# Patient Record
Sex: FEMALE | Race: WHITE | NOT HISPANIC OR LATINO | Employment: OTHER | URBAN - METROPOLITAN AREA
[De-identification: names, ages, dates, MRNs, and addresses within clinical notes are randomized per-mention and may not be internally consistent; named-entity substitution may affect disease eponyms.]

---

## 2023-05-10 PROBLEM — R55 SYNCOPE: Status: ACTIVE | Noted: 2023-05-10

## 2023-11-12 ENCOUNTER — ON-DEMAND VIRTUAL (OUTPATIENT)
Dept: URGENT CARE | Facility: CLINIC | Age: 63
End: 2023-11-12
Payer: MEDICARE

## 2023-11-12 DIAGNOSIS — H57.10 PAIN IN EYE, UNSPECIFIED LATERALITY: Primary | ICD-10-CM

## 2023-11-12 PROCEDURE — 99203 OFFICE O/P NEW LOW 30 MIN: CPT | Mod: 95,,, | Performed by: NURSE PRACTITIONER

## 2023-11-12 PROCEDURE — 99203 PR OFFICE/OUTPT VISIT, NEW, LEVL III, 30-44 MIN: ICD-10-PCS | Mod: 95,,, | Performed by: NURSE PRACTITIONER

## 2023-11-12 NOTE — PROGRESS NOTES
Subjective:      Patient ID: Francesca Arrington is a 63 y.o. female.    Vitals:  vitals were not taken for this visit.     Chief Complaint: No chief complaint on file.      Visit Type: TELE AUDIOVISUAL    Present with the patient at the time of consultation: TELEMED PRESENT WITH PATIENT: None    Past Medical History:   Diagnosis Date    Anxiety     Breast cancer     COPD (chronic obstructive pulmonary disease)     Fibromyalgia     Heart murmur     Hyperlipidemia     Hypoglycemia     IBS (irritable bowel syndrome)     Insomnia     Lymphedema     Migraine     Seizure disorder     this is syncope not epilepsy    Syncope 06/2022     Past Surgical History:   Procedure Laterality Date    BILATERAL MASTECTOMY Bilateral 2019    HYSTERECTOMY      IMPLANTATION OF PERMANENT SACRAL NERVE STIMULATOR      SINUS SURGERY      TONSILLECTOMY, ADENOIDECTOMY       Review of patient's allergies indicates:   Allergen Reactions    Iodine Anaphylaxis    Sulfa (sulfonamide antibiotics) Swelling    Codeine     Hydrocodone-acetaminophen      Current Outpatient Medications on File Prior to Visit   Medication Sig Dispense Refill    acarbose (PRECOSE) 25 MG Tab Take by mouth.      ADVAIR -21 mcg/actuation HFAA inhaler Inhale 2 puffs into the lungs 2 (two) times daily.      atorvastatin (LIPITOR) 10 MG tablet       COMBIVENT RESPIMAT  mcg/actuation inhaler       dexAMETHasone (DECADRON) 4 MG Tab Take 2.5 tabs daily for 5 days 13 tablet 0    DULoxetine (CYMBALTA) 30 MG capsule       eszopiclone (LUNESTA) 2 MG Tab       gabapentin (NEURONTIN) 400 MG capsule       galcanezumab-gnlm (EMGALITY PEN) 120 mg/mL PnIj Inject 120 mg into the skin every 28 days. 1 mL 11    HYDROcodone-acetaminophen (NORCO) 7.5-325 mg per tablet       LORazepam (ATIVAN) 0.5 MG tablet Take 0.5 mg by mouth daily as needed.      protriptyline (VIVACTIL) 10 MG Tab Take 1 tablet (10 mg total) by mouth 2 (two) times daily. 180 tablet 3    roflumilast (DALIRESP) 500 mcg Tab   "     tiotropium bromide (SPIRIVA RESPIMAT) 2.5 mcg/actuation inhaler       traZODone (DESYREL) 100 MG tablet       UBRELVY 100 mg tablet TAKE 1 TABLET BY MOUTH AS NEEDED FOR MIGRAINE 10 tablet 3     No current facility-administered medications on file prior to visit.     Family History   Problem Relation Age of Onset    Heart disease Mother     Stroke Mother     Asbestos Father     Migraines Sister     Suicide Sister     Schizophrenia Sister     Suicide Brother         1/2023    Migraines Brother     Migraines Other     Migraines Other     Migraines Son     Migraines Daughter            [facility] Odvv Intake    11/12/2023  5:48 PM CST - Filed by Patient   Describe your reason for todays visit I have been having a thunderclap in my eye and right temple since yesterday that lasts a few seconds but comes every 5 minutes or less. I hurts so bad   What is your current physical address in the event of a medical emergency? [address] [place] [address]   Are you able to take your vital signs? Yes   Systolic Blood Pressure: 128   Diastolic Blood Pressure: 75   Weight: 140   Height: 5   Pulse: 97   Temperature: 96.8   Respiration rate:    Pulse Oxygen: 95   Please attach any relevant images or files          62 yo female with c/o searing pain to right eye. She states she has hx of migraines and it is not a migraine. She states "worst pain of entire life".  She denies visual changes. Denies nausea , vomiting. Denies weakness, denies facial numbness.         Constitution: Negative.   HENT: Negative.     Cardiovascular: Negative.    Eyes:  Positive for eye pain. Negative for eye trauma, foreign body in eye, eye discharge, eye redness, photophobia, vision loss, double vision, blurred vision and eyelid swelling.   Respiratory: Negative.     Gastrointestinal: Negative.    Endocrine: negative.   Genitourinary: Negative.  Negative for frequency and urgency.   Musculoskeletal: Negative.    Skin: Negative.  "   Allergic/Immunologic: Negative.    Neurological: Negative.  Negative for dizziness, light-headedness, facial drooping, loss of balance, headaches, disorientation, altered mental status and numbness.   Hematologic/Lymphatic: Negative.    Psychiatric/Behavioral: Negative.  Negative for altered mental status and disorientation.         Objective:   The physical exam was conducted virtually.  Physical Exam   Constitutional: She is oriented to person, place, and time. She appears well-developed.   HENT:   Head: Normocephalic and atraumatic.   Ears:   Right Ear: Hearing, tympanic membrane and external ear normal.   Left Ear: Hearing, tympanic membrane and external ear normal.   Nose: Nose normal.   Mouth/Throat: Uvula is midline, oropharynx is clear and moist and mucous membranes are normal.   Eyes: Conjunctivae and EOM are normal. Pupils are equal, round, and reactive to light.   Neck: Neck supple.   Cardiovascular: Normal rate.   Pulmonary/Chest: Effort normal and breath sounds normal.   Musculoskeletal: Normal range of motion.         General: Normal range of motion.   Neurological: She is alert and oriented to person, place, and time.   Skin: Skin is warm.   Psychiatric: Her behavior is normal. Thought content normal.   Nursing note and vitals reviewed.      Assessment:     1. Pain in eye, unspecified laterality        Plan:   Patient advised to go to ED for further evaluation.   Smoker, on home o2, hx migraines, worst pain of life and not usual migraine.      Pain in eye, unspecified laterality

## 2023-11-14 PROBLEM — G44.059 SUNCT (SHORT UNILATERAL NEURALGIFORM HEADACHE, CONJUNCTIVAL INJ/TEAR): Status: ACTIVE | Noted: 2023-11-14

## 2024-02-08 PROBLEM — G25.0 ESSENTIAL TREMOR: Status: ACTIVE | Noted: 2024-02-08
